# Patient Record
Sex: FEMALE | Race: ASIAN
[De-identification: names, ages, dates, MRNs, and addresses within clinical notes are randomized per-mention and may not be internally consistent; named-entity substitution may affect disease eponyms.]

---

## 2018-10-27 ENCOUNTER — HOSPITAL ENCOUNTER (EMERGENCY)
Dept: HOSPITAL 72 - EMR | Age: 58
Discharge: HOME | End: 2018-10-27
Payer: COMMERCIAL

## 2018-10-27 VITALS — DIASTOLIC BLOOD PRESSURE: 74 MMHG | SYSTOLIC BLOOD PRESSURE: 139 MMHG

## 2018-10-27 VITALS — SYSTOLIC BLOOD PRESSURE: 139 MMHG | DIASTOLIC BLOOD PRESSURE: 74 MMHG

## 2018-10-27 VITALS — WEIGHT: 145 LBS | BODY MASS INDEX: 24.75 KG/M2 | HEIGHT: 64 IN

## 2018-10-27 DIAGNOSIS — S43.101A: Primary | ICD-10-CM
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
## 2018-10-27 PROCEDURE — 96372 THER/PROPH/DIAG INJ SC/IM: CPT

## 2018-10-27 PROCEDURE — 29105 APPLICATION LONG ARM SPLINT: CPT

## 2018-10-27 PROCEDURE — 99283 EMERGENCY DEPT VISIT LOW MDM: CPT

## 2018-10-27 NOTE — EMERGENCY ROOM REPORT
History of Present Illness


General


Chief Complaint:  Shoulder Injury


Source:  Family Member





Present Illness


HPI


Patient presents with a fall onto the right shoulder


Patient has increased pain to the anterior shoulder





Patient describes a mechanical fall





Denies any pain to the elbow or the wrist denies any neuropathy





Denies any head injury denies any neck pain


Allergies:  


Coded Allergies:  


     No Known Allergies (Unverified , 10/27/18)





Patient History


Past Medical History:  see triage record


Pertinent Family History:  none


Last Menstrual Period:  na


Pregnant Now:  No


Reviewed Nursing Documentation:  PMH: Agreed; PSxH: Agreed





Nursing Documentation-PMH


Past Medical History:  No Stated History





Review of Systems


All Other Systems:  negative except mentioned in HPI





Physical Exam





Vital Signs








  Date Time  Temp Pulse Resp B/P (MAP) Pulse Ox O2 Delivery O2 Flow Rate FiO2


 


10/27/18 04:10 97.5 70 20 139/74 98 Room Air  








Sp02 EP Interpretation:  reviewed, normal


General Appearance:  well appearing, no apparent distress


Head:  normocephalic, atraumatic


Eyes:  bilateral eye PERRL, bilateral eye EOMI


ENT:  hearing grossly normal, normal pharynx, TMs + canals normal, uvula midline


Neck:  full range of motion, supple


Respiratory:  lungs clear


Cardiovascular #1:  regular rate, rhythm


Gastrointestinal:  non tender, soft, no mass


Musculoskeletal:  other - Tender on palpation of the before meals joint on the 

right side, otherwise no obvious clinical dislocation, sensory is intact 

distally sensory intact over the trapezius,


Neurologic:  alert, oriented x3, responsive


Skin:  normal color, no rash


Lymphatic:  no adenopathy





Procedures


Splinting


Splinting :  


   Consent:  Verbal


   Location:  Right shoulder


   Pre-Made Type:  Immobilizer


   Splint:  Shoulder immobilizer


   Pre-Proc Neuro Vasc Exam:  normal


   Post-Proc Neuro Vasc Exam:  normal


   Patient Tolerated:  Well


   Complications:  None





Medical Decision Making


Diagnostic Impression:  


 Primary Impression:  


 AC separation


ER Course


Given the patient's history exam and presentation x-ray imaging is obtained


There is evidence of before meals separation





Patient is provided with pain medication remains neurologically intact pain is 

improved patient has a shoulder immobilizer placed and stable for close follow-

up





Last Vital Signs








  Date Time  Temp Pulse Resp B/P (MAP) Pulse Ox O2 Delivery O2 Flow Rate FiO2


 


10/27/18 04:19 97.5  20 139/74 98 Room Air  


 


10/27/18 04:18  68      








Status:  improved


Disposition:  HOME, SELF-CARE


Condition:  Improved


Scripts


Acetaminophen With Codeine (T#3) (TYLENOL #3 TAB*) Y Tab


1 TAB ORAL Q8H PRN for For Pain, #10 TAB


   Prov: Mandy Gu DO         10/27/18 


Ibuprofen* (MOTRIN*) 600 Mg Tablet


600 MG ORAL Q8H PRN for For Pain, #20 TAB 0 Refills


   Prov: Mandy Gu DO         10/27/18





Additional Instructions:  


Patient is provided with the discharge instructions notified to follow up with 

primary doctor in the next 2-3 days otherwise return to the er with any 

worsening symptoms.


Please note that this report is being documented using DRAGON technology.  This 

can lead to erroneous entry secondary to incorrect interpretation by the 

dictating instrument.











Mandy Gu DO Oct 27, 2018 04:34

## 2018-10-27 NOTE — DIAGNOSTIC IMAGING REPORT
EXAM:

  XR Right Shoulder Complete, 3 Views

 

CLINICAL HISTORY:

  PAIN

 

TECHNIQUE:

  Three views of the right shoulder.

 

COMPARISON:

  No relevant prior studies available.

 

FINDINGS:

  Bones/joints:  There is elevation of the clavicle in relation to the 

acromion as well as increased coracoclavicular distance measuring up to 2 

cm.  Findings may reflect a degree of acromioclavicular joint injury, age 

indeterminate.  No acute fracture. 

  Soft tissues:  Unremarkable.

 

IMPRESSION:     

  There is elevation of the clavicle in relation to the acromion as well 

as increased coracoclavicular distance measuring up to 2 cm.  Findings 

may reflect a degree of acromioclavicular joint injury, age indeterminate.